# Patient Record
Sex: MALE | Race: ASIAN | NOT HISPANIC OR LATINO | URBAN - METROPOLITAN AREA
[De-identification: names, ages, dates, MRNs, and addresses within clinical notes are randomized per-mention and may not be internally consistent; named-entity substitution may affect disease eponyms.]

---

## 2023-04-26 ENCOUNTER — EMERGENCY (EMERGENCY)
Facility: HOSPITAL | Age: 23
LOS: 1 days | Discharge: HOME | End: 2023-04-26
Attending: EMERGENCY MEDICINE | Admitting: EMERGENCY MEDICINE
Payer: COMMERCIAL

## 2023-04-26 VITALS
OXYGEN SATURATION: 97 % | HEART RATE: 80 BPM | SYSTOLIC BLOOD PRESSURE: 110 MMHG | DIASTOLIC BLOOD PRESSURE: 69 MMHG | RESPIRATION RATE: 16 BRPM | TEMPERATURE: 99 F

## 2023-04-26 VITALS
HEART RATE: 89 BPM | RESPIRATION RATE: 18 BRPM | OXYGEN SATURATION: 99 % | SYSTOLIC BLOOD PRESSURE: 121 MMHG | DIASTOLIC BLOOD PRESSURE: 69 MMHG | TEMPERATURE: 98 F | HEIGHT: 64 IN | WEIGHT: 160.06 LBS

## 2023-04-26 PROCEDURE — 71046 X-RAY EXAM CHEST 2 VIEWS: CPT | Mod: 26

## 2023-04-26 PROCEDURE — 72125 CT NECK SPINE W/O DYE: CPT | Mod: 26,ME

## 2023-04-26 PROCEDURE — 99284 EMERGENCY DEPT VISIT MOD MDM: CPT

## 2023-04-26 PROCEDURE — G1004: CPT

## 2023-04-26 PROCEDURE — 71046 X-RAY EXAM CHEST 2 VIEWS: CPT

## 2023-04-26 PROCEDURE — 99284 EMERGENCY DEPT VISIT MOD MDM: CPT | Mod: 25

## 2023-04-26 PROCEDURE — 72125 CT NECK SPINE W/O DYE: CPT | Mod: ME

## 2023-04-26 RX ORDER — KETOROLAC TROMETHAMINE 30 MG/ML
30 SYRINGE (ML) INJECTION ONCE
Refills: 0 | Status: DISCONTINUED | OUTPATIENT
Start: 2023-04-26 | End: 2023-04-26

## 2023-04-26 RX ORDER — DIAZEPAM 5 MG
5 TABLET ORAL ONCE
Refills: 0 | Status: DISCONTINUED | OUTPATIENT
Start: 2023-04-26 | End: 2023-04-26

## 2023-04-26 RX ORDER — IBUPROFEN 200 MG
800 TABLET ORAL ONCE
Refills: 0 | Status: COMPLETED | OUTPATIENT
Start: 2023-04-26 | End: 2023-04-26

## 2023-04-26 RX ADMIN — Medication 800 MILLIGRAM(S): at 20:38

## 2023-04-26 NOTE — ED PROVIDER NOTE - NEUROLOGICAL, MLM
Alert and oriented, no focal deficits, no motor or sensory deficits. neurovascular intact. UE strength 5/5 bilaterally.

## 2023-04-26 NOTE — ED PROVIDER NOTE - CLINICAL SUMMARY MEDICAL DECISION MAKING FREE TEXT BOX
22-year-old male with no past medical history presents to the ED complaining of neck pain and upper back pain after bench pressing this morning at the gym.  Patient explains he was doing inclined bench pressing, went to re-rack the  barbell slightly by behind him when he suddenly developed felt a pop, neck pain and upper back pain.   Patient states that since then pain has worsened, applied lidocaine patch with minimal improvement.  Denies fever, chills, headache, dizziness, visual changes, chest pain, shortness of breath, abdominal pain, upper extremity weakness or paresthesias.  Pain worse with movement and palpation.  22-year-old male with no past medical history presents to the ED complaining of neck pain and upper back pain after bench pressing this morning at the gym.  Patient explains he was doing inclined bench pressing, went to re-rack the  barbell slightly by behind him when he suddenly developed felt a pop, neck pain and upper back pain.   Patient states that since then pain has worsened, applied lidocaine patch with minimal improvement.  Denies fever, chills, headache, dizziness, visual changes, chest pain, shortness of breath, abdominal pain, upper extremity weakness or paresthesias.  Pain worse with movement and palpation.

## 2023-04-26 NOTE — ED PROVIDER NOTE - MUSCULOSKELETAL, MLM
No midline cervical or thoracic tenderness.  Bilateral paracervical and upper trapezius tenderness.  Radial pulses equal and intact bilaterally.  Upper extremity strength 5 out of 5 bilaterally.  Pain reproduced with palpation and movement.

## 2023-04-26 NOTE — ED PROVIDER NOTE - NS ED ATTENDING STATEMENT MOD
This was a shared visit with the ANNELISE. I reviewed and verified the documentation and independently performed the documented:

## 2023-04-26 NOTE — ED PROVIDER NOTE - CARE PROVIDER_API CALL
Michael Saxena)  Orthopaedic Surgery Surgery  [address] [place] [address]  Phone: [phone]  Fax: [phone]  Follow Up Time: 1-3 Days

## 2023-04-26 NOTE — ED PROVIDER NOTE - SKIN, MLM
Skin normal color for race, warm, dry and intact. No evidence of rash. no swelling, erythema, ecchymosis or skin color changes to neck or upper back. cap refill <2 sec

## 2023-04-26 NOTE — ED PROVIDER NOTE - PROGRESS NOTE DETAILS
Spoke with radiologist Dr. Aquino, correct report " no disc bulge or herniation", states he will change it in the report.

## 2023-04-26 NOTE — ED ADULT NURSE NOTE - OBJECTIVE STATEMENT
Pt presented to ED c/o neck and upper back pain.  Pt states he felt a pop while using bench press at gym.  No neuro deficits noted.   Denies any chest pain or SOB.  No n/v/d.  Pt ambulated in hallway- steady gait noted.  Maintain comfort and safety.

## 2023-04-26 NOTE — ED ADULT TRIAGE NOTE - CHIEF COMPLAINT QUOTE
injured himself at the gym today. Pain between his upper back. Unable to move head back and forth without pain.

## 2023-04-26 NOTE — ED ADULT NURSE REASSESSMENT NOTE - NS ED NURSE REASSESS COMMENT FT1
Pt resting comfortably in bed at this time, offers no complaints.  Denies any chest pain or SOB.  No n/v/d.  Pt states neck/upper back pain is better at this time.  Pt received motrin 800mg po with relief, refused po valium and im toradol.  Pending dc home.  Maintain comfort and safety.

## 2023-04-26 NOTE — ED PROVIDER NOTE - OBJECTIVE STATEMENT
22-year-old male with no past medical history presents to the ED complaining of neck pain and upper back pain after bench pressing this morning at the gym.  Patient explains he was doing inclined bench pressing, went to re-rack the  barbell slightly by behind him when he suddenly developed felt a pop, neck pain and upper back pain.   Patient states that since then pain has worsened, applied lidocaine patch with minimal improvement.  Denies fever, chills, headache, dizziness, visual changes, chest pain, shortness of breath, abdominal pain, upper extremity weakness or paresthesias.  Pain worse with movement and palpation.

## 2023-04-26 NOTE — ED PROVIDER NOTE - PATIENT PORTAL LINK FT
You can access the FollowMyHealth Patient Portal offered by [facility] by registering at the following website: [email or web address]. By joining [email or web address]’s FollowMyHealth portal, you will also be able to view your health information using other applications (apps) compatible with our system.